# Patient Record
Sex: MALE | HISPANIC OR LATINO | Employment: FULL TIME | URBAN - METROPOLITAN AREA
[De-identification: names, ages, dates, MRNs, and addresses within clinical notes are randomized per-mention and may not be internally consistent; named-entity substitution may affect disease eponyms.]

---

## 2019-03-26 ENCOUNTER — OFFICE VISIT (OUTPATIENT)
Dept: URGENT CARE | Facility: CLINIC | Age: 24
End: 2019-03-26
Payer: COMMERCIAL

## 2019-03-26 VITALS
HEIGHT: 69 IN | BODY MASS INDEX: 24.73 KG/M2 | SYSTOLIC BLOOD PRESSURE: 110 MMHG | DIASTOLIC BLOOD PRESSURE: 68 MMHG | TEMPERATURE: 99.8 F | WEIGHT: 167 LBS | HEART RATE: 76 BPM | OXYGEN SATURATION: 98 % | RESPIRATION RATE: 16 BRPM

## 2019-03-26 DIAGNOSIS — J06.9 VIRAL URI WITH COUGH: ICD-10-CM

## 2019-03-26 DIAGNOSIS — R68.89 FLU-LIKE SYMPTOMS: ICD-10-CM

## 2019-03-26 LAB
FLUAV+FLUBV AG SPEC QL IA: NEGATIVE
INT CON NEG: NEGATIVE
INT CON POS: POSITIVE

## 2019-03-26 PROCEDURE — 99203 OFFICE O/P NEW LOW 30 MIN: CPT | Performed by: NURSE PRACTITIONER

## 2019-03-26 PROCEDURE — 87804 INFLUENZA ASSAY W/OPTIC: CPT | Performed by: NURSE PRACTITIONER

## 2019-03-26 RX ORDER — OSELTAMIVIR PHOSPHATE 75 MG/1
75 CAPSULE ORAL 2 TIMES DAILY
Qty: 10 CAP | Refills: 0 | Status: CANCELLED | OUTPATIENT
Start: 2019-03-26

## 2019-03-26 ASSESSMENT — ENCOUNTER SYMPTOMS
RHINORRHEA: 1
ABDOMINAL PAIN: 0
VOMITING: 0
CHILLS: 0
EYE PAIN: 0
SHORTNESS OF BREATH: 0
HEADACHES: 1
DIZZINESS: 0
FEVER: 1
SORE THROAT: 1
DIARRHEA: 0
NAUSEA: 0
COUGH: 1
MYALGIAS: 1

## 2019-03-26 NOTE — LETTER
March 26, 2019         Patient: Danny Pelayo   YOB: 1995   Date of Visit: 3/26/2019           To Whom it May Concern:    Danny Pelayo was seen in my clinic on 3/26/2019. He may return to work on 3/27/19.    If you have any questions or concerns, please don't hesitate to call.        Sincerely,           SAMY Kiser.  Electronically Signed

## 2019-03-26 NOTE — PROGRESS NOTES
Subjective:     Danny Pelayo is a 23 y.o. male who presents for Headache (x2 days); Fever (x2 days); and Cough (x2 days, productive cough with green mucus)       Fever    This is a new problem. Episode onset: 2 days. The problem occurs constantly. The problem has been unchanged. The maximum temperature noted was 101 to 101.9 F. The temperature was taken using a tympanic thermometer. Associated symptoms include congestion, coughing, headaches, muscle aches and a sore throat. Pertinent negatives include no abdominal pain, chest pain, diarrhea, ear pain, nausea, rash or vomiting. He has tried acetaminophen for the symptoms. The treatment provided no relief.   Risk factors: no occupational exposure, no recent sickness and no recent travel    URI    This is a new problem. Episode onset: 2 days. The problem has been unchanged. The maximum temperature recorded prior to his arrival was 101 - 101.9 F. The fever has been present for 1 to 2 days. Associated symptoms include congestion, coughing, headaches, rhinorrhea and a sore throat. Pertinent negatives include no abdominal pain, chest pain, diarrhea, ear pain, joint swelling, nausea, plugged ear sensation, rash or vomiting. He has tried acetaminophen for the symptoms. The treatment provided no relief.   History reviewed. No pertinent past medical history.History reviewed. No pertinent surgical history.  Social History     Social History   • Marital status: Single     Spouse name: N/A   • Number of children: N/A   • Years of education: N/A     Occupational History   • Not on file.     Social History Main Topics   • Smoking status: Never Smoker   • Smokeless tobacco: Never Used   • Alcohol use No   • Drug use: No   • Sexual activity: Not on file     Other Topics Concern   • Not on file     Social History Narrative   • No narrative on file    History reviewed. No pertinent family history. Review of Systems   Constitutional: Positive for fever and malaise/fatigue. Negative  "for chills.   HENT: Positive for congestion, rhinorrhea and sore throat. Negative for ear pain.    Eyes: Negative for pain.   Respiratory: Positive for cough. Negative for shortness of breath.    Cardiovascular: Negative for chest pain.   Gastrointestinal: Negative for abdominal pain, diarrhea, nausea and vomiting.   Genitourinary: Negative for hematuria.   Musculoskeletal: Positive for myalgias.   Skin: Negative for rash.   Neurological: Positive for headaches. Negative for dizziness.   No Known Allergies   Objective:   /68   Pulse 76   Temp 37.7 °C (99.8 °F)   Resp 16   Ht 1.753 m (5' 9\")   Wt 75.8 kg (167 lb)   SpO2 98%   BMI 24.66 kg/m²   Physical Exam   Constitutional: He is oriented to person, place, and time. He appears well-developed and well-nourished. No distress.   HENT:   Head: Normocephalic and atraumatic.   Right Ear: Tympanic membrane normal.   Left Ear: Tympanic membrane normal.   Nose: Nose normal. Right sinus exhibits no maxillary sinus tenderness and no frontal sinus tenderness. Left sinus exhibits no maxillary sinus tenderness and no frontal sinus tenderness.   Mouth/Throat: Uvula is midline, oropharynx is clear and moist and mucous membranes are normal. No posterior oropharyngeal edema, posterior oropharyngeal erythema or tonsillar abscesses. No tonsillar exudate.   Eyes: Pupils are equal, round, and reactive to light. Conjunctivae and EOM are normal. Right eye exhibits no discharge. Left eye exhibits no discharge.   Cardiovascular: Normal rate and regular rhythm.    No murmur heard.  Pulmonary/Chest: Effort normal and breath sounds normal. No respiratory distress.   Abdominal: Soft. He exhibits no distension. There is no tenderness.   Lymphadenopathy:     He has no cervical adenopathy.   Neurological: He is alert and oriented to person, place, and time. He has normal reflexes. No sensory deficit.   Skin: Skin is warm, dry and intact.   Psychiatric: He has a normal mood and affect. "         Assessment/Plan:   Assessment    1. Flu-like symptoms  POCT Influenza A/B   2. Viral URI with cough       Influenza negative    It was explained to the pt. Today that due to the viral nature of the pt's illness, we will treat symptomatically today. Encouraged OTC supportive meds PRN. Humidification, increase fluids, avoid night time dairy.   Patient given precautionary s/sx that mandate immediate follow up and evaluation in the ED. Advised of risks of not doing so.    DDX, Supportive care, and indications for immediate follow-up discussed with patient.    Instructed to return to clinic or nearest emergency department if we are not available for any change in condition, further concerns, or worsening of symptoms.    The patient demonstrated a good understanding and agreed with the treatment plan.

## 2020-06-03 ENCOUNTER — TELEPHONE (OUTPATIENT)
Dept: SCHEDULING | Facility: IMAGING CENTER | Age: 25
End: 2020-06-03

## 2020-06-10 ENCOUNTER — OFFICE VISIT (OUTPATIENT)
Dept: MEDICAL GROUP | Facility: IMAGING CENTER | Age: 25
End: 2020-06-10
Payer: COMMERCIAL

## 2020-06-10 VITALS
TEMPERATURE: 98.8 F | HEIGHT: 69 IN | BODY MASS INDEX: 26.39 KG/M2 | WEIGHT: 178.2 LBS | RESPIRATION RATE: 14 BRPM | OXYGEN SATURATION: 95 % | HEART RATE: 57 BPM | DIASTOLIC BLOOD PRESSURE: 62 MMHG | SYSTOLIC BLOOD PRESSURE: 110 MMHG

## 2020-06-10 DIAGNOSIS — J30.2 SEASONAL ALLERGIES: ICD-10-CM

## 2020-06-10 DIAGNOSIS — M25.532 LEFT WRIST PAIN: ICD-10-CM

## 2020-06-10 DIAGNOSIS — Z23 NEED FOR VACCINATION: ICD-10-CM

## 2020-06-10 DIAGNOSIS — Z76.89 ENCOUNTER TO ESTABLISH CARE WITH NEW DOCTOR: ICD-10-CM

## 2020-06-10 PROCEDURE — 99214 OFFICE O/P EST MOD 30 MIN: CPT | Mod: 25 | Performed by: NURSE PRACTITIONER

## 2020-06-10 PROCEDURE — 90651 9VHPV VACCINE 2/3 DOSE IM: CPT | Performed by: NURSE PRACTITIONER

## 2020-06-10 PROCEDURE — 90471 IMMUNIZATION ADMIN: CPT | Performed by: NURSE PRACTITIONER

## 2020-06-10 ASSESSMENT — PATIENT HEALTH QUESTIONNAIRE - PHQ9: CLINICAL INTERPRETATION OF PHQ2 SCORE: 0

## 2020-06-10 ASSESSMENT — PAIN SCALES - GENERAL: PAINLEVEL: NO PAIN

## 2020-06-10 NOTE — PROGRESS NOTES
Subjective:   CC: Establish Care and Arm Pain (left, x 2 years)    HISTORY OF THE PRESENT ILLNESS: Patient is a 25 y.o. male. Denies prior PCP.  Denies significant medical history. Patient is here today to establish care and discuss left wrist pain .     States that he has had intermittent left wrist pain for the past 2 years. States while lifting a refrigerator he bent his left wrist back. States since this injury he has had intermittent left wrist pain when he bares weight on his left wrist. States he is unable to do a push up with his hand flat, he has to have his hand in a wrist formation to avoid pain. Describes pain as stiff and irritating. Denies numbness, tingling, or decreased hand strength. Denies dropping objects with left hand. 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 States when he is cutting hair and using his left hand in a pinching fashion he has increase pain. Denies any over the counter medication or home remedies at this time. States he has a history of left shoulder dislocation and is uncertain if it is related to pain.     States that he has an intermittent runny nose from seasonal allergies. States when he wakes up he has increase mucous in the back of his throat. Denies itchy eyes, ears, or throat; nasal congestion; sneezing; and/or sinus pressure. Denies taking any OTC medication at this time.    Allergies: Patient has no known allergies.    No current [facility]-ordered outpatient medications on file.     No current [facility]-ordered facility-administered medications on file.      History reviewed. No pertinent past medical history.    History reviewed. No pertinent surgical history.    Social History     Tobacco Use   • Smoking status: Never Smoker   • Smokeless tobacco: Never Used   Substance Use Topics   • Alcohol use: No   • Drug use: No     Social History     Social History Narrative   • Not on file     Family History   Problem Relation Age of Onset   • Diabetes  "Paternal Grandmother    • Heart Disease Mother    • No Known Problems Father    • No Known Problems Sister    • No Known Problems Brother    • No Known Problems Maternal Grandmother    • Alcohol abuse Maternal Grandfather    • No Known Problems Sister        Health Maintenance: Completed.    ROS:   Constitutional: Denies fever, chills, night sweats, weight loss/gain  or malaise/fatigue.   HENT: Denies nasal congestion, sore throat, hearing loss, enlarged thyroid, or difficulty swallowing. Intermittent runny nose, PND from seasonal allergies, see HPI.  Eyes: Denies changes in vision, pain. Wears corrective wear.   Respiratory: Denies cough, SOB at rest or activity.    Cardiovascular: Denies tachycardia, chest pain, palpitations, or  leg swelling.   Gastrointestinal: Denies N/V/C/D, abdominal pain, loss appetite, reflux, or hematochezia.  Genitourinary: Denies difficulty voiding, dysuria, nocturia, or hematuria.   Skin: Negative for rash or worrisome moles.   Neurological: Negative for dizziness, focal weakness and headaches.   Endo/Heme/Allergies: Denies bruise/bleed easily. Intermittent seasonal allergies.   Psychiatric/Behavioral: Denies depression, nervous/anxious, difficulty sleeping.  MSK: History of left shoulder dislocation and intermittent left wrist pain, see HPI.    Objective:   Exam: /62 (BP Location: Left arm, Patient Position: Sitting, BP Cuff Size: Adult)   Pulse (!) 57   Temp 37.1 °C (98.8 °F) (Temporal)   Resp 14   Ht 1.753 m (5' 9\")   Wt 80.8 kg (178 lb 3.2 oz)   SpO2 95%  Body mass index is 26.32 kg/m².    General: Normal appearing. No distress.  HEENT: Normocephalic. Eyes conjunctiva clear lids without ptosis, PERRLA, ears normal shape and contour, canals are clear bilaterally, tympanic membranes pearly gray, intact, non-bulging, no drainage noted, no turbinate erythema, no polyps visible, oropharynx is with mild erythema, PND, no edema or exudates. Teeth intact. Patient wore a mask " during visit.  Neck: Supple without JVD or abnormal masses. Small soft mobile thyroid palpated, no nodules palpated, non-tender.  Pulmonary: Clear to ausculation.  Normal effort. No rales, ronchi, or wheezing.  Cardiovascular: Regular rate and rhythm without murmur. Pedal and radial pulses are intact and equal bilaterally.  Abdomen: Soft, nontender, nondistended. Normal bowel sounds. Liver and spleen are not palpable.  Neurologic: Grossly non-focal.  Lymph: No cervical, submandibular, or supraclavicular lymph nodes are palpable.  Skin: Warm and dry.  No obvious lesions.  Musculoskeletal: Normal gait. No extremity cyanosis, clubbing, or edema. Normal grasp strength bilaterally, Full AROM of wrists and hands without pain. Negative tinel's test. Mild pain noted when holding left wrist in extension position.  Psych: Normal mood and affect. Alert and oriented x3. Judgment and insight is normal.    Assessment & Plan:   1. Encounter to establish care with new doctor  2. Need for vaccination  Medical, past, surgical history reviewed with patient. Discussed with patient the risk and benefits of receiving vaccines. Discussed CDC recommendations for immunizations and USPSTF guidelines for screening exams. Discussed HPV vaccine, verbalized understanding and willingness to receive. Encouraged patient to wash hands regularly and avoid sick contacts while supporting immune system.    -     Gardasil 9    3. Left wrist pain  This is a chronic stable condition. Discussed the benefit of referral to physical therapy, verbalized understanding and willingness. Discussed taking ibuprofen 200-600 mg TID as needed for pain with food. Instructed to not exceed more than 3 days in a row to decrease risk of GI bleed or GI upset.Discussed using ice therapy 4 times a day for 20 minutes each time. Instructed to use Arnica gel topical as tolerated to areas of pain for external use only. If pain is not improved with active participation of PT will  evaluate further with appropriate imaging and/or referral to appropriate specialist.    -     REFERRAL TO PHYSICAL THERAPY Reason for Therapy: Eval/Treat/Report    4. Seasonal allergies  This is a chronic stable condition. Discussed taking OTC allergy medication of choice for intermittent symptoms or if he is going to be outside for a period of time.    Return in about 6 weeks (around 7/22/2020) for follow up on left wrist pain and 2nd HPV vaccine.    Please note that this dictation was created using voice recognition software. I have made every reasonable attempt to correct obvious errors, but I expect that there are errors of grammar and possibly content that I did not discover before finalizing the note.

## 2020-06-10 NOTE — PATIENT INSTRUCTIONS
Ibuprofen 200-600 mg TID as needed for pain with food. Instructed to not exceed more than 3 days in a row to decrease risk of GI bleed or GI upset.    Ice therapy 4 times a day for 20 minutes each time.    Arnica gel topical as tolerated to areas of pain for external use only.

## 2020-07-13 ENCOUNTER — APPOINTMENT (OUTPATIENT)
Dept: PHYSICAL THERAPY | Facility: REHABILITATION | Age: 25
End: 2020-07-13
Payer: COMMERCIAL

## 2020-07-20 ENCOUNTER — APPOINTMENT (OUTPATIENT)
Dept: PHYSICAL THERAPY | Facility: REHABILITATION | Age: 25
End: 2020-07-20
Payer: COMMERCIAL

## 2020-07-27 ENCOUNTER — APPOINTMENT (OUTPATIENT)
Dept: PHYSICAL THERAPY | Facility: REHABILITATION | Age: 25
End: 2020-07-27
Payer: COMMERCIAL

## 2020-08-03 ENCOUNTER — APPOINTMENT (OUTPATIENT)
Dept: PHYSICAL THERAPY | Facility: REHABILITATION | Age: 25
End: 2020-08-03
Payer: COMMERCIAL